# Patient Record
Sex: MALE | Race: WHITE | NOT HISPANIC OR LATINO | URBAN - METROPOLITAN AREA
[De-identification: names, ages, dates, MRNs, and addresses within clinical notes are randomized per-mention and may not be internally consistent; named-entity substitution may affect disease eponyms.]

---
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
## 2023-11-27 NOTE — ED PROVIDER NOTE - SKIN TURGOR
Reason for Call:  Appointment Request    Patient requesting this type of appt:  Preventive     Requested provider: Pato Tabares Blue- no showed appointment at [facility] today    Reason patient unable to be scheduled: Not within requested timeframe    When does patient want to be seen/preferred time: 3-7 days    Comments: [facility] only    Okay to leave a detailed message?: Yes at Other phone number:  [phone]     Call taken on 11/24/2023 at 12:20 PM by Saskia NICHOLS  
Left message for phone number: [phone] & spoke to Allison, pt is scheduled with Earl 12/20/23 12:10pm  
resilient/elastic